# Patient Record
Sex: MALE | Race: WHITE | NOT HISPANIC OR LATINO | Employment: STUDENT | URBAN - METROPOLITAN AREA
[De-identification: names, ages, dates, MRNs, and addresses within clinical notes are randomized per-mention and may not be internally consistent; named-entity substitution may affect disease eponyms.]

---

## 2023-01-09 ENCOUNTER — TREATMENT (OUTPATIENT)
Dept: PHYSICAL THERAPY | Facility: CLINIC | Age: 23
End: 2023-01-09
Payer: COMMERCIAL

## 2023-01-09 DIAGNOSIS — G89.29 CHRONIC MIDLINE LOW BACK PAIN WITHOUT SCIATICA: Primary | ICD-10-CM

## 2023-01-09 DIAGNOSIS — M54.50 CHRONIC MIDLINE LOW BACK PAIN WITHOUT SCIATICA: Primary | ICD-10-CM

## 2023-01-09 PROCEDURE — PTSP1 PR CUSTOM PT EVALUTATION & TREATMENT OF SELF-PAY PT 1ST VISIT: Performed by: PHYSICAL THERAPIST

## 2023-01-09 NOTE — PROGRESS NOTES
Physical Therapy Initial Evaluation and Plan of Care  [address]  [place] [address]    Patient: Ramon Calderon   : 2000  Diagnosis/ICD-10 Code:  No primary diagnosis found.  Referring practitioner: Cayetano Klein MD  Date of Initial Visit: 2023  Today's Date: 2023  Patient seen for Visit count could not be calculated. Make sure you are using a visit which is associated with an episode. session         Visit Diagnoses:  No diagnosis found.      Subjective Questionnaire: Oswestry: 26%      Subjective Evaluation    History of Present Illness  Mechanism of injury: Pt is a 22 year old male presenting to the clinic with low back pain. 3 years ago, he strained his back playing basketball. About a month ago, he strained it again. Each time he strained it he was doing a twisting motion. He has noticed over the years that sitting for long periods causes him more pain. Longer than 30 min to an hour is bothersome, especially if he is stooped over. He has a lot of tightness in his hips and shoulders. He can manage his symptoms if he is in a good stretching routine, but he recently went on a trip and got out of the habit of stretching. He typically likes to run, play basketball.       Patient Occupation: Student  Quality of life: excellent    Pain  Current pain ratin  At worst pain ratin  Quality: dull ache, discomfort and tight  Aggravating factors: prolonged positioning  Progression: improved    Patient Goals  Patient goals for therapy: decreased pain, increased motion, increased strength, independence with ADLs/IADLs and return to sport/leisure activities             Objective          Active Range of Motion     Lumbar   Flexion: 85 degrees   Extension: 32 degrees   Left lateral flexion: 25 degrees   Right lateral flexion: 25 degrees     Additional Active Range of Motion Details  Lumbar PA's reveal normal mobility and no pain.    Strength/Myotome Testing      Left Hip   Planes of Motion   Flexion: 5  Abduction: 4-    Right Hip   Planes of Motion   Flexion: 5  Extension: 5  Abduction: 4-    Left Knee   Flexion: 5  Extension: 5    Right Knee   Flexion: 5  Extension: 5    Lumbar Flexibility Comments:   Pt demonstrates bilateral tightness of hamstrings and hip flexors.          Assessment & Plan     Assessment  Impairments: activity intolerance, impaired physical strength and lacks appropriate home exercise program  Functional Limitations: uncomfortable because of pain and sitting  Assessment details: Pt is a 22 year old male presenting to the clinic with low back pain that was exacerbated by a recent strain while playing basketball. He demonstrates decreased core and glut strength. His lumbar AROM is WNL. He does not have any tenderness along his spine. His impairments limit his ability to sit for long periods without pain. Pt would likely improve with more glut and core stability. Pt would benefit from skilled PT services so he can reach his long term goals.   Prognosis: good    Goals  Plan Goals: SHORT TERM GOALS:     2 weeks  1. Pt independent with HEP  2. Pt to demonstrate bilateral hip strength 4/5 in all planes to improved stability of the core/trunk     LONG TERM GOALS:   6 weeks  1. Pt to demonstrates glut and core strength at 5/5 in order to help with stability while playing basketball.  2. Pt to demonstrate ability to perform full functional squat with good form and without increased pain in the low back   3. Pt to report being able to sit for 1 hour without increase in pain in the back      Plan  Therapy options: will be seen for skilled therapy services  Planned modality interventions: cryotherapy, dry needling, electrical stimulation/Russian stimulation, high voltage pulsed current (pain management), thermotherapy (hydrocollator packs) and TENS  Planned therapy interventions: manual therapy, neuromuscular re-education, postural training, soft tissue  mobilization, spinal/joint mobilization, strengthening, stretching, therapeutic activities, functional ROM exercises, flexibility, balance/weight-bearing training, abdominal trunk stabilization, body mechanics training and home exercise program  Duration in visits: 1  Duration in weeks: 12  Treatment plan discussed with: patient        History # of Personal Factors and/or Comorbidities: LOW (0)  Examination of Body System(s): # of elements: LOW (1-2)  Clinical Presentation: STABLE   Clinical Decision Making: LOW       Timed:         Manual Therapy:         mins  07312;     Therapeutic Exercise:    15     mins  03057;     Neuromuscular Timi:        mins  69599;    Therapeutic Activity:          mins  62722;     Gait Training:           mins  95225;     Ultrasound:          mins  15568;    Ionto                                   mins   97287  Self Care                            mins   76713  Canalith Repos         mins 87337      Un-Timed:  Electrical Stimulation:         mins  02597 ( );  Dry Needling          mins self-pay  Traction          mins 50036  Low Eval      25    Mins  22079  Mod Eval          Mins  50108  High Eval                            Mins  72315        Timed Treatment:   15   mins   Total Treatment:     40   mins          PT: Latisha Dang PT   License Number: [id number]  Electronically signed by Latisha Dang PT, 01/09/23, 3:15 PM EST    Certification Period: 1/9/2023 thru 4/8/2023  I certify that the therapy services are furnished while this patient is under my care.  The services outlined above are required by this patient, and will be reviewed every 90 days.         Physician Signature:__________________________________________________    PHYSICIAN: Cayetano Klein MD  NPI: [id number]                                      DATE:      Please sign and return via fax to [email or web address] . Thank you, [facility].

## 2023-01-30 ENCOUNTER — TREATMENT (OUTPATIENT)
Dept: PHYSICAL THERAPY | Facility: CLINIC | Age: 23
End: 2023-01-30
Payer: COMMERCIAL

## 2023-01-30 DIAGNOSIS — G89.29 CHRONIC MIDLINE LOW BACK PAIN WITHOUT SCIATICA: Primary | ICD-10-CM

## 2023-01-30 DIAGNOSIS — M54.50 CHRONIC MIDLINE LOW BACK PAIN WITHOUT SCIATICA: Primary | ICD-10-CM

## 2023-01-30 PROCEDURE — PTSPVT PR CUSTOM PT TREATMENT OF SELF PAY PATIENT: Performed by: PHYSICAL THERAPIST

## 2023-01-30 NOTE — PROGRESS NOTES
Physical Therapy Daily Progress Note    Subjective   Ramon Abdelsayed reports: his low back is feeling better with all the exercises/stretches. He is having more pain up in his shoulders.      Objective   See Exercise, Manual, and Modality Logs for complete treatment.       Assessment/Plan   Pt tolerated treatment well. He was educated on thoracic mobility and strengthening activities today. He required cues to keep his back flat against the wall with thoracic wall angels. Pt would benefit from continued skilled PT.     Progress per Plan of Care           Manual Therapy:         mins  25129;  Therapeutic Exercise:    24     mins  46633;     Neuromuscular Timi:    15    mins  91742;    Therapeutic Activity:     10     mins  55728;     Gait Training:           mins  07861;     Ultrasound:          mins  33092;    Electrical Stimulation:         mins  22633 ( );  E-Stim Attended:         mins  81390  Iontophoresis         mins 95779   Traction          mins  70916  Fluidotherapy          mins  00388  Dry Needling          mins self-pay - No Charge  Paraffin          mins  96415    Timed Treatment:  49   mins   Total Treatment:     49   mins    Latisha Dang, PT, DPT  Physical Therapist

## 2023-02-21 ENCOUNTER — TREATMENT (OUTPATIENT)
Dept: PHYSICAL THERAPY | Facility: CLINIC | Age: 23
End: 2023-02-21
Payer: COMMERCIAL

## 2023-02-21 DIAGNOSIS — M54.50 CHRONIC MIDLINE LOW BACK PAIN WITHOUT SCIATICA: Primary | ICD-10-CM

## 2023-02-21 DIAGNOSIS — G89.29 CHRONIC MIDLINE LOW BACK PAIN WITHOUT SCIATICA: Primary | ICD-10-CM

## 2023-02-21 PROCEDURE — PTSPVT PR CUSTOM PT TREATMENT OF SELF PAY PATIENT: Performed by: PHYSICAL THERAPIST

## 2023-03-07 ENCOUNTER — TREATMENT (OUTPATIENT)
Dept: PHYSICAL THERAPY | Facility: CLINIC | Age: 23
End: 2023-03-07

## 2023-03-07 DIAGNOSIS — M54.50 CHRONIC MIDLINE LOW BACK PAIN WITHOUT SCIATICA: Primary | ICD-10-CM

## 2023-03-07 DIAGNOSIS — G89.29 CHRONIC MIDLINE LOW BACK PAIN WITHOUT SCIATICA: Primary | ICD-10-CM

## 2023-03-07 DIAGNOSIS — G25.89 SCAPULAR DYSKINESIS: ICD-10-CM

## 2023-03-07 PROCEDURE — PTSPVT PR CUSTOM PT TREATMENT OF SELF PAY PATIENT: Performed by: PHYSICAL THERAPIST

## 2023-03-07 NOTE — PROGRESS NOTES
Physical Therapy Daily Progress Note    Subjective   Ramon Abdelsayed reports: he overdid it playing basketball a couple of weeks ago and strained his lower back. This got better over time. Then he strained his R upper trap while reaching for something in the night. It was so painful he went to his PCP who told him it was just a strain but that his shoulder blades did not look even.      Objective          Postural Observations    Additional Postural Observation Details  R scapular winging    Active Range of Motion     Lumbar   Flexion: 88 degrees   Extension: 30 degrees   Left lateral flexion: 30 degrees   Right lateral flexion: 30 degrees     Strength/Myotome Testing     Left Shoulder     Isolated Muscles   Lower trapezius: 4-   Middle trapezius: 4     Right Shoulder     Isolated Muscles   Lower trapezius: 4-   Middle trapezius: 4-     Left Hip   Planes of Motion   Abduction: 5    Right Hip   Planes of Motion   Abduction: 5      See Exercise, Manual, and Modality Logs for complete treatment.       Assessment/Plan   Pt is a 22 year old male who has been coming to PT for low back pain, but is now having more symptoms as a result of scapular dyskinesis on the right. His lumbar AROM and hip strength have improved significantly. He does demonstrate right scapular winging. He also has weakness in his mid and lower traps. Pt would benefit from continued skilled PT.     Progress per Plan of Care  1x every 3 weeks for 3 more visits.    Long term goal (12 weeks):  Pt to demonstrate 4+/5 mid and lower trap strength bilaterally in order to enhance stability with lifting.         SELF PAY    Latisha Dang, PT, DPT  Physical Therapist

## 2023-03-28 ENCOUNTER — TREATMENT (OUTPATIENT)
Dept: PHYSICAL THERAPY | Facility: CLINIC | Age: 23
End: 2023-03-28

## 2023-03-28 DIAGNOSIS — G89.29 CHRONIC MIDLINE LOW BACK PAIN WITHOUT SCIATICA: Primary | ICD-10-CM

## 2023-03-28 DIAGNOSIS — M54.50 CHRONIC MIDLINE LOW BACK PAIN WITHOUT SCIATICA: Primary | ICD-10-CM

## 2023-03-28 DIAGNOSIS — G25.89 SCAPULAR DYSKINESIS: ICD-10-CM

## 2023-03-28 PROCEDURE — PTSPVT PR CUSTOM PT TREATMENT OF SELF PAY PATIENT: Performed by: PHYSICAL THERAPIST
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
